# Patient Record
Sex: FEMALE | Race: WHITE | URBAN - METROPOLITAN AREA
[De-identification: names, ages, dates, MRNs, and addresses within clinical notes are randomized per-mention and may not be internally consistent; named-entity substitution may affect disease eponyms.]

---

## 2017-01-04 PROCEDURE — 88305 TISSUE EXAM BY PATHOLOGIST: CPT | Performed by: INTERNAL MEDICINE

## 2017-01-05 ENCOUNTER — HOSPITAL ENCOUNTER (OUTPATIENT)
Dept: LAB | Age: 62
Discharge: HOME | End: 2017-01-05

## 2017-04-13 ENCOUNTER — APPOINTMENT (RX ONLY)
Dept: URBAN - METROPOLITAN AREA CLINIC 349 | Facility: CLINIC | Age: 62
Setting detail: DERMATOLOGY
End: 2017-04-13

## 2017-04-13 DIAGNOSIS — L23.89 ALLERGIC CONTACT DERMATITIS DUE TO OTHER AGENTS: ICD-10-CM

## 2017-04-13 PROCEDURE — ? RECOMMENDATIONS

## 2017-04-13 PROCEDURE — 99201: CPT

## 2017-04-13 PROCEDURE — ? PRESCRIPTION

## 2017-04-13 PROCEDURE — ? COUNSELING

## 2017-04-13 RX ORDER — MUPIROCIN 20 MG/G
OINTMENT TOPICAL
Qty: 1 | Refills: 2 | Status: ERX | COMMUNITY
Start: 2017-04-13

## 2017-04-13 RX ORDER — CETIRIZINE HCL/PSEUDOEPHEDRINE 5 MG-120MG
TABLET, EXTENDED RELEASE 12 HR ORAL
Qty: 60 | Refills: 1 | Status: ERX | COMMUNITY
Start: 2017-04-13

## 2017-04-13 RX ORDER — PREDNISONE 10 MG/1
TABLET ORAL
Qty: 45 | Refills: 0 | Status: ERX | COMMUNITY
Start: 2017-04-13

## 2017-04-13 RX ADMIN — MUPIROCIN: 20 OINTMENT TOPICAL at 00:00

## 2017-04-13 RX ADMIN — PREDNISONE: 10 TABLET ORAL at 00:00

## 2017-04-13 RX ADMIN — Medication: at 00:00

## 2017-04-13 ASSESSMENT — LOCATION ZONE DERM: LOCATION ZONE: FACE

## 2017-04-13 ASSESSMENT — LOCATION DETAILED DESCRIPTION DERM
LOCATION DETAILED: LEFT CENTRAL MALAR CHEEK
LOCATION DETAILED: RIGHT INFERIOR CENTRAL MALAR CHEEK

## 2017-04-13 ASSESSMENT — LOCATION SIMPLE DESCRIPTION DERM
LOCATION SIMPLE: LEFT CHEEK
LOCATION SIMPLE: RIGHT CHEEK

## 2017-04-13 NOTE — PROCEDURE: RECOMMENDATIONS
Recommendations (Free Text): Prednisone 10 mg  2 tonight then 4 each a.m. X 4 days, 3x4, 2x4, 1x7\\nZyrtec 10mg every 12 hours\\nMupirocin ointment three times daily to cracks and fissures\\nSamples of pandel cream to use three times daily x 4 days then twice daily
Detail Level: Zone

## 2017-04-13 NOTE — HPI: RASH
How Severe Is Your Rash?: moderate
Is This A New Presentation, Or A Follow-Up?: Rash
Additional History: Waxes and wains.  Better but much worse this past Monday.

## 2017-07-12 ENCOUNTER — RX ONLY (OUTPATIENT)
Age: 62
Setting detail: RX ONLY
End: 2017-07-12

## 2017-07-12 RX ORDER — MOMETASONE FUROATE 1 MG/G
CREAM TOPICAL
Qty: 1 | Refills: 0 | Status: ERX | COMMUNITY
Start: 2017-07-12

## 2022-07-13 ENCOUNTER — INITIAL CONSULT (OUTPATIENT)
Dept: CARDIOLOGY CLINIC | Age: 67
End: 2022-07-13
Payer: COMMERCIAL

## 2022-07-13 VITALS
WEIGHT: 245.6 LBS | HEART RATE: 69 BPM | HEIGHT: 65 IN | DIASTOLIC BLOOD PRESSURE: 94 MMHG | BODY MASS INDEX: 40.92 KG/M2 | SYSTOLIC BLOOD PRESSURE: 172 MMHG

## 2022-07-13 DIAGNOSIS — E78.5 DYSLIPIDEMIA: ICD-10-CM

## 2022-07-13 DIAGNOSIS — E66.01 MORBID OBESITY (HCC): ICD-10-CM

## 2022-07-13 DIAGNOSIS — I49.3 PVC'S (PREMATURE VENTRICULAR CONTRACTIONS): ICD-10-CM

## 2022-07-13 DIAGNOSIS — R06.02 SHORTNESS OF BREATH: Primary | ICD-10-CM

## 2022-07-13 DIAGNOSIS — R73.03 PRE-DIABETES: ICD-10-CM

## 2022-07-13 PROCEDURE — 1123F ACP DISCUSS/DSCN MKR DOCD: CPT | Performed by: INTERNAL MEDICINE

## 2022-07-13 PROCEDURE — 93000 ELECTROCARDIOGRAM COMPLETE: CPT | Performed by: INTERNAL MEDICINE

## 2022-07-13 PROCEDURE — 99204 OFFICE O/P NEW MOD 45 MIN: CPT | Performed by: INTERNAL MEDICINE

## 2022-07-13 RX ORDER — CETIRIZINE HYDROCHLORIDE 10 MG/1
TABLET ORAL
COMMUNITY

## 2022-07-13 RX ORDER — CARVEDILOL 6.25 MG/1
TABLET ORAL AS NEEDED
COMMUNITY
Start: 2021-11-10

## 2022-07-13 RX ORDER — HYDROCHLOROTHIAZIDE 25 MG/1
25 TABLET ORAL DAILY
COMMUNITY
Start: 2021-10-28

## 2022-07-13 ASSESSMENT — ENCOUNTER SYMPTOMS: SHORTNESS OF BREATH: 1

## 2022-07-13 NOTE — PATIENT INSTRUCTIONS
Patient Education        Shortness of Breath: Care Instructions  Your Care Instructions     Shortness of breath has many causes. Sometimes conditions such as anxiety can lead to shortness of breath. Some people get mild shortness of breath when they exercise. Trouble breathing also can be a symptom of a serious problem, such asasthma, lung disease, emphysema, heart problems, and pneumonia. If your shortness of breath continues, you may need tests and treatment. Watchfor any changes in your breathing and other symptoms. Follow-up care is a key part of your treatment and safety. Be sure to make and go to all appointments, and call your doctor if you are having problems. It's also a good idea to know your test results and keep alist of the medicines you take. How can you care for yourself at home?  Do not smoke or allow others to smoke around you. If you need help quitting, talk to your doctor about stop-smoking programs and medicines. These can increase your chances of quitting for good.  Get plenty of rest and sleep.  Take your medicines exactly as prescribed. Call your doctor if you think you are having a problem with your medicine.  Find healthy ways to deal with stress. ? Exercise daily. ? Get plenty of sleep. ? Eat regularly and well. When should you call for help? Call 911 anytime you think you may need emergency care. For example, call if:     You have severe shortness of breath.      You have symptoms of a heart attack. These may include:  ? Chest pain or pressure, or a strange feeling in the chest.  ? Sweating. ? Shortness of breath. ? Nausea or vomiting. ? Pain, pressure, or a strange feeling in the back, neck, jaw, or upper belly or in one or both shoulders or arms. ? Lightheadedness or sudden weakness. ? A fast or irregular heartbeat. After you call 911, the  may tell you to chew 1 adult-strength or 2 to 4 low-dose aspirin. Wait for an ambulance.  Do not try to drive yourself. Call your doctor now or seek immediate medical care if:     Your shortness of breath gets worse or you start to wheeze. Wheezing is a high-pitched sound when you breathe.      You wake up at night out of breath or have to prop your head up on several pillows to breathe.      You are short of breath after only light activity or while at rest.   Watch closely for changes in your health, and be sure to contact your doctor if:     You do not get better over the next 1 to 2 days. Where can you learn more? Go to https://JAZD MarketspeSwatchcloudeweb.Preisbock. org and sign in to your [employer] account. Enter S780 in the [employer] box to learn more about \"Shortness of Breath: Care Instructions. \"     If you do not have an account, please click on the \"Sign Up Now\" link. Current as of: March 9, 2022               Content Version: 13.3  © 2047-9196 Healthwise, [employer]. Care instructions adapted under license by [facility] ([facility]). If you have questions about a medical condition or this instruction, always ask your healthcare professional. [place] [address] any warranty or liability for your use of this information.          Make a \"SMART\" plan:  Specific  Measurable  Attainable  Realistic  Time Limited

## 2022-07-13 NOTE — PROGRESS NOTES
[facility] [place] CARDIOLOGY  [address], [address]  PHONE: [phone]      22    NAME:  Carolina Messina  : 1955  MRN: [id number]         SUBJECTIVE:   Carolina Messina is a 78 y.o. female seen for a consultation visit regarding the following:     Chief Complaint   Patient presents with    Consultation    Irregular Heart Beat            HPI:  Consultation is requested by Sumanth Gilmore MD for evaluation of Consultation and Irregular Heart Beat   . Consult dyspnea up inclines. She describes herself as always active though always struggled with weight. She's gained about 40-45 lbs since 2019. Her mother Alejandro Hodge, my patient) broke her hip, patient admits she used food as a stress relief. She's interested in starting an exercise program to get her life back on track but wants to make sure its safe. She's attributed her dyspnea to the weight gain. She had a life line screen that was normal apart from a mildly elevated CRP but she has arthritis and obesity as well. She is a nurse by training but distressed by having white coat syndrome. She monitors it at home and sees it consistently 120-140/70-95, generally well controlled. She has a history of benign isolated PVC's but those have improved significantly in recent years. Past Medical History, Past Surgical History, Family history, Social History, and Medications were all reviewed with the patient today and updated as necessary. Prior to Admission medications    Medication Sig Start Date End Date Taking?  Authorizing Provider   carvedilol (COREG) 6.25 MG tablet TAKE 1 TABLET BY MOUTH TWO TIMES A DAY 11/10/21  Yes Historical Provider, MD   hydroCHLOROthiazide (HYDRODIURIL) 25 MG tablet Take 25 mg by mouth daily 10/28/21  Yes Historical Provider, MD   metFORMIN (GLUCOPHAGE) 500 MG tablet TAKE 1 TABLET BY MOUTH TWICE A DAY WITH A MEAL FOR 90 DAYS 22  Yes Historical Provider, MD cetirizine (ZYRTEC) 10 MG tablet Take by mouth   Yes Historical Provider, MD   Probiotic Product (PROBIOTIC-10 PO) Take 1 tablet by mouth   Yes Historical Provider, MD   CALCIUM CITRATE PO Take by mouth   Yes Historical Provider, MD   MAGNESIUM CITRATE PO Take by mouth   Yes Historical Provider, MD   Coenzyme Q10 (COQ10 PO) Take by mouth   Yes Historical Provider, MD   Cholecalciferol (VITAMIN D3) 125 MCG (5000 UT) TABS Take by mouth   Yes Historical Provider, MD   Multiple Vitamins-Minerals (LUTEIN-ZEAXANTHIN PO) Take by mouth   Yes Historical Provider, MD     Allergies   Allergen Reactions    Methylprednisolone Sodium Succ Other (See Comments)     hypertension  Other reaction(s): Other-A  hypertension  hypertension  hypertension      Amoxicillin-Pot Clavulanate Other (See Comments)     Stomach discomfort/diarrhea  Stomach discomfort/diarrhea  Stomach discomfort/diarrhea      Cephalexin Diarrhea     Past Medical History:   Diagnosis Date    Obesity     Pre-diabetes     PVC's (premature ventricular contractions)     White coat syndrome without diagnosis of hypertension      Past Surgical History:   Procedure Laterality Date     SECTION      x2    MANDIBLE SURGERY       Family History   Problem Relation Age of Onset    Arrhythmia Mother     Pacemaker Mother     Pulmonary Fibrosis Father      Social History     Tobacco Use    Smoking status: Never Smoker    Smokeless tobacco: Never Used   Substance Use Topics    Alcohol use: Yes     Comment: rarely       ROS:    Review of Systems   Constitutional: Positive for malaise/fatigue and weight gain. Cardiovascular: Negative for chest pain. Respiratory: Positive for shortness of breath. Musculoskeletal: Positive for arthritis. PHYSICAL EXAM:   BP (!) 172/94   Pulse 69   Ht 5' 4.5\" (1.638 m)   Wt 245 lb 9.6 oz (111.4 kg)   BMI 41.51 kg/m²      Physical Exam  Constitutional:       Appearance: Normal appearance.    HENT:      Head: Normocephalic and atraumatic. Eyes:      Conjunctiva/sclera: Conjunctivae normal.   Neck:      Vascular: No carotid bruit. Cardiovascular:      Rate and Rhythm: Normal rate and regular rhythm. Heart sounds: No murmur heard. No friction rub. No gallop. Pulmonary:      Effort: No respiratory distress. Breath sounds: No wheezing or rales. Abdominal:      General: Abdomen is flat. There is no distension. Palpations: Abdomen is soft. Tenderness: There is no abdominal tenderness. Musculoskeletal:         General: No swelling. Cervical back: Neck supple. Skin:     General: Skin is warm and dry. Neurological:      General: No focal deficit present. Mental Status: She is alert. Psychiatric:         Mood and Affect: Mood normal.         Behavior: Behavior normal.         Medical problems and test results were reviewed with the patient today. DATA REVIEW    Recent labs reviewed:  Normal CMP, CBC, TSH  A1C 5.9%    HDL 48     EKG    Sinus  Rhythm 69  normal axis  RBBB  normal ST/T waves    CXR/IMAGING        DEVICE INTERROGATION        OUTSIDE RECORDS REVIEW    Records from outside providers have been reviewed and summarized as noted in the HPI, past history and data review sections of this note, and reviewed with patient. .       ASSESSMENT and PLAN    Urvashi Bucio was seen today for consultation and irregular heart beat. Diagnoses and all orders for this visit:    Shortness of breath  -     EKG 12 Lead  -     Transthoracic echocardiogram (TTE) complete with contrast, bubble, strain, and 3D PRN; Future  -     Nuclear stress test with myocardial perfusion; Future    PVC's (premature ventricular contractions)    Pre-diabetes    Dyslipidemia    Morbid obesity (HCC)          IMPRESSION:    Multifactorial dyspnea.   Moderate to high pre test probability of disease is class IIa indication for imaging protocol      Echo evaluate LV function    Low risk to begin walking    Return for AFTER PROCEDURE TO REVIEW RESULTS. Thank you for allowing me to participate in this patient's care. Please call or contact me if there are any questions or concerns regarding the above.       Tarqi Haddad MD  07/13/22  11:40 AM

## 2022-07-22 PROBLEM — R06.02 SHORTNESS OF BREATH: Status: ACTIVE | Noted: 2022-07-22

## 2022-08-23 ENCOUNTER — OFFICE VISIT (OUTPATIENT)
Dept: CARDIOLOGY CLINIC | Age: 67
End: 2022-08-23
Payer: COMMERCIAL

## 2022-08-23 VITALS
DIASTOLIC BLOOD PRESSURE: 84 MMHG | SYSTOLIC BLOOD PRESSURE: 150 MMHG | HEART RATE: 84 BPM | HEIGHT: 64 IN | WEIGHT: 245 LBS | BODY MASS INDEX: 41.83 KG/M2

## 2022-08-23 DIAGNOSIS — R03.0 ELEVATED BLOOD PRESSURE READING WITHOUT DIAGNOSIS OF HYPERTENSION: Primary | ICD-10-CM

## 2022-08-23 DIAGNOSIS — R06.02 SHORTNESS OF BREATH: ICD-10-CM

## 2022-08-23 PROCEDURE — 1123F ACP DISCUSS/DSCN MKR DOCD: CPT | Performed by: INTERNAL MEDICINE

## 2022-08-23 PROCEDURE — 99213 OFFICE O/P EST LOW 20 MIN: CPT | Performed by: INTERNAL MEDICINE

## 2022-08-23 NOTE — PATIENT INSTRUCTIONS
drinks like soda. The Mediterranean diet may also include red wine with your meal--1 glass eachday for women and up to 2 glasses a day for men. Tips for eating at home  Use herbs, spices, garlic, lemon zest, and citrus juice instead of salt to add flavor to foods. Add avocado slices to your sandwich instead of scott. Have fish for lunch or dinner instead of red meat. Brush the fish with olive oil, and broil or grill it. Sprinkle your salad with seeds or nuts instead of cheese. Cook with olive or canola oil instead of butter or oils that are high in saturated fat. Switch from 2% milk or whole milk to 1% or fat-free milk. Dip raw vegetables in a vinaigrette dressing or hummus instead of dips made from mayonnaise or sour cream.  Have a piece of fruit for dessert instead of a piece of cake. Try baked apples, or have some dried fruit. Tips for eating out  Try broiled, grilled, baked, or poached fish instead of having it fried or breaded. Ask your  to have your meals prepared with olive oil instead of butter. Order dishes made with marinara sauce or sauces made from olive oil. Avoid sauces made from cream or mayonnaise. Choose whole-grain breads, whole wheat pasta and pizza crust, brown rice, beans, and lentils. Cut back on butter or margarine on bread. Instead, you can dip your bread in a small amount of olive oil. Ask for a side salad or grilled vegetables instead of french fries or chips. Where can you learn more? Go to https://Movi Medicalwilmaeb.Microarrays. org and sign in to your [employer] account. Enter [phone] in the [facility] box to learn more about \"Learning About the Mediterranean Diet. \"     If you do not have an account, please click on the \"Sign Up Now\" link. Current as of: September 8, 2021               Content Version: 13.3  © 3330-8425 Healthwise, Incorporated. Care instructions adapted under license by [facility] ([facility]).  If you have questions about a medical condition or this instruction, always ask your healthcare professional. [place] [address] any warranty or liability for your use of this information.

## 2022-08-23 NOTE — PROGRESS NOTES
[facility] CARDIOLOGY  [address], [address]  PHONE: [phone]      22    NAME:  Armand Martínez  : 1955  MRN: [id number]         SUBJECTIVE:   Armand Martínez is a 78 y.o. female seen for a follow up visit regarding the following:     Chief Complaint   Patient presents with    Shortness of Breath    Palpitations    Follow-up     Cardiac testing            HPI:  Follow up  Shortness of Breath, Palpitations, and Follow-up (Cardiac testing)   . Follow up evaluation of dyspnea which looks well. She did demonstrate mild aerobic deconditioning which she expected having had 40 lb weight gain over 3 years. She has been nordic walking since last visit, she's lost an inch in several body measurements since then. She's limiting carbs which is her biggest temptation. Her BP was well controlled at each test and at home she stays 130-140. She uses carvedilol a few days a week for pvc control. PAST CARDIAC HISTORY:  Aug 2022        NST - breast attenuation, no ischemia, normal EF        Echo - mild LVH (1.3), normal SF and valves. Said to be abnormal DF but reviewed numbers, lat and septal e' ~ 6 only abnormalities noted. Key CAD CHF Meds            carvedilol (COREG) 6.25 MG tablet (Taking)    Class: Historical Med    hydroCHLOROthiazide (HYDRODIURIL) 25 MG tablet (Taking)    Class: Historical Med              Past Medical History, Past Surgical History, Family history, Social History, and Medications were all reviewed with the patient today and updated as necessary. Prior to Admission medications    Medication Sig Start Date End Date Taking?  Authorizing Provider   carvedilol (COREG) 6.25 MG tablet as needed 11/10/21  Yes Historical Provider, MD   hydroCHLOROthiazide (HYDRODIURIL) 25 MG tablet Take 25 mg by mouth daily 10/28/21  Yes Historical Provider, MD   cetirizine (ZYRTEC) 10 MG tablet Take by mouth   Yes Historical Provider, MD CALCIUM CITRATE PO Take by mouth   Yes Historical Provider, MD   MAGNESIUM CITRATE PO Take by mouth   Yes Historical Provider, MD   Coenzyme Q10 (COQ10 PO) Take by mouth   Yes Historical Provider, MD   Cholecalciferol (VITAMIN D3) 125 MCG (5000 UT) TABS Take by mouth   Yes Historical Provider, MD   metFORMIN (GLUCOPHAGE) 500 MG tablet TAKE 1 TABLET BY MOUTH TWICE A DAY WITH A MEAL FOR 90 DAYS 22   Historical Provider, MD   Probiotic Product (PROBIOTIC-10 PO) Take 1 tablet by mouth    Historical Provider, MD   Multiple Vitamins-Minerals (LUTEIN-ZEAXANTHIN PO) Take by mouth    Historical Provider, MD     Allergies   Allergen Reactions    Methylprednisolone Sodium Succ Other (See Comments)     Pt states can take \"regular prednisone\"  hypertension  Other reaction(s): Other-A  hypertension  hypertension  hypertension      Amoxicillin-Pot Clavulanate Other (See Comments)     Stomach discomfort/diarrhea  Stomach discomfort/diarrhea  Stomach discomfort/diarrhea      Cephalexin Diarrhea     Past Medical History:   Diagnosis Date    Obesity     Pre-diabetes     PVC's (premature ventricular contractions)     White coat syndrome without diagnosis of hypertension      Past Surgical History:   Procedure Laterality Date     SECTION      x2    MANDIBLE SURGERY       Family History   Problem Relation Age of Onset    Arrhythmia Mother     Pacemaker Mother     Pulmonary Fibrosis Father      Social History     Tobacco Use    Smoking status: Never    Smokeless tobacco: Never   Substance Use Topics    Alcohol use: Yes     Comment: rarely       ROS:    Review of Systems   Cardiovascular:  Negative for chest pain.         PHYSICAL EXAM:   BP (!) 150/84   Pulse 84   Ht 5' 4\" (1.626 m)   Wt 245 lb (111.1 kg)   BMI 42.05 kg/m²      Wt Readings from Last 3 Encounters:   22 245 lb (111.1 kg)   22 245 lb (111.1 kg)   22 245 lb (111.1 kg)     BP Readings from Last 3 Encounters:   22 (!) 150/84   22 132/82   07/20/22 (!) 138/90     Pulse Readings from Last 3 Encounters:   08/23/22 84   07/20/22 74   07/13/22 69           Physical Exam  Vitals reviewed. Medical problems and test results were reviewed with the patient today. DATA REVIEW           EKG        CXR/IMAGING        DEVICE INTERROGATION        OUTSIDE RECORDS REVIEW    Records from outside providers have been reviewed and summarized as noted in the HPI, past history and data review sections of this note, and reviewed with patient. .       ASSESSMENT and PLAN    Alysa Centeno was seen today for shortness of breath, palpitations and follow-up. Diagnoses and all orders for this visit:    Elevated blood pressure reading without diagnosis of hypertension    Shortness of breath        IMPRESSION:    Feeling better now walking for exercise, already losing inches. BP at home is controlled and will continue to improve with ongoing exercise and eventual weight loss. Continue current plan, pvc's controlled with minimal prn therapy        Return if symptoms worsen or fail to improve. Thank you for allowing me to participate in this patient's care. Please call or contact me if there are any questions or concerns regarding the above.       Sharad Brunner MD  08/23/22  12:21 PM

## 2024-08-07 ENCOUNTER — HOSPITAL ENCOUNTER (OUTPATIENT)
Dept: ULTRASOUND IMAGING | Age: 69
Discharge: HOME | End: 2024-08-10
Payer: MEDICARE

## 2024-08-07 DIAGNOSIS — R22.41 LOCALIZED SWELLING, MASS AND LUMP, LOWER LIMB, RIGHT: ICD-10-CM

## 2024-08-07 PROCEDURE — 76882 US LMTD JT/FCL EVL NVASC XTR: CPT

## 2025-05-21 NOTE — PROGRESS NOTES
[facility] CARDIOLOGY  [address] [place] [address], [address]  [place] [address]  PHONE: [phone]      25    NAME:  Malren Ren  : 1955  MRN: [id number]         SUBJECTIVE:   Marlen Ren is a 69 y.o. female seen for a follow up visit regarding the following:     Chief Complaint   Patient presents with    Follow-up    Palpitations            HPI:  Follow up  Follow-up and Palpitations   .    Last seen in  with negative evaluation of dyspnea  returns with complaints of palpitations/hx pvcs.  She went to [facility] 25 with palpitations, weakness, dizziness. She had a mild leukocytosis with left shift.  Chest CT normal heart size, evidence of small airway disease, mild dependent pulmonary venous congestion. Provider noted concern for PNA given leukocytosis and CXR findings but because she wasn't terribly symptomatic didn't treat it. Gave her a dose of carvedilol which she had taken in past years and sent her home    She had slowly building frequency of palpitations for some time.  She had stopped the carvedilol after she retired, worked with her then primary provider to taper it off because she felt better, though he did warn her she would likely have more palpitations.  She's actually been off it for about 5 years without difficulty until recently.  Since resuming the carvedilol she has felt better.  She also saw her PCP about the leukocytosis, she did go ahead and treat her with a Z-pack.  (Patient's father  with ILD so she's been extra attentive about this). Otherwise she's felt pretty well but has noticed some neuropathic symptoms in her feet slowly creeping up to the ankle, feels like a \"buzzing\", not much pain.  Her A1C is controlled.  She is taking a Mag supplement already for muscle cramps.  Exercise is in fits and starts, just completed a course of physical therapy with low back pain, has some \"minor\" compression fractures in her back.  She does not have osteoporosis but

## 2025-05-22 ENCOUNTER — OFFICE VISIT (OUTPATIENT)
Age: 70
End: 2025-05-22
Payer: MEDICARE

## 2025-05-22 VITALS
DIASTOLIC BLOOD PRESSURE: 80 MMHG | WEIGHT: 245 LBS | BODY MASS INDEX: 41.83 KG/M2 | HEIGHT: 64 IN | HEART RATE: 72 BPM | SYSTOLIC BLOOD PRESSURE: 144 MMHG

## 2025-05-22 DIAGNOSIS — R00.2 PALPITATIONS: Primary | ICD-10-CM

## 2025-05-22 DIAGNOSIS — R07.89 CHEST DISCOMFORT: ICD-10-CM

## 2025-05-22 PROCEDURE — 99214 OFFICE O/P EST MOD 30 MIN: CPT | Performed by: INTERNAL MEDICINE

## 2025-05-22 PROCEDURE — 93000 ELECTROCARDIOGRAM COMPLETE: CPT | Performed by: INTERNAL MEDICINE

## 2025-05-22 PROCEDURE — 1159F MED LIST DOCD IN RCRD: CPT | Performed by: INTERNAL MEDICINE

## 2025-05-22 PROCEDURE — 1123F ACP DISCUSS/DSCN MKR DOCD: CPT | Performed by: INTERNAL MEDICINE

## 2025-05-22 PROCEDURE — 1160F RVW MEDS BY RX/DR IN RCRD: CPT | Performed by: INTERNAL MEDICINE

## 2025-05-22 PROCEDURE — 1126F AMNT PAIN NOTED NONE PRSNT: CPT | Performed by: INTERNAL MEDICINE

## 2025-05-22 RX ORDER — VALSARTAN AND HYDROCHLOROTHIAZIDE 80; 12.5 MG/1; MG/1
1 TABLET, FILM COATED ORAL DAILY
COMMUNITY

## 2025-06-03 ENCOUNTER — RESULTS FOLLOW-UP (OUTPATIENT)
Dept: CARDIOLOGY | Age: 70
End: 2025-06-03

## 2025-06-18 ENCOUNTER — TELEPHONE (OUTPATIENT)
Dept: PULMONOLOGY | Age: 70
End: 2025-06-18

## 2025-06-23 ENCOUNTER — OFFICE VISIT (OUTPATIENT)
Dept: PULMONOLOGY | Age: 70
End: 2025-06-23
Payer: MEDICARE

## 2025-06-23 ENCOUNTER — HOSPITAL ENCOUNTER (OUTPATIENT)
Dept: GENERAL RADIOLOGY | Age: 70
Discharge: HOME | End: 2025-06-25
Payer: MEDICARE

## 2025-06-23 VITALS — HEIGHT: 64 IN | WEIGHT: 242 LBS | TEMPERATURE: 97.1 F | BODY MASS INDEX: 41.32 KG/M2

## 2025-06-23 DIAGNOSIS — R93.89 ABNORMAL CXR: ICD-10-CM

## 2025-06-23 DIAGNOSIS — R06.02 SHORTNESS OF BREATH: Primary | ICD-10-CM

## 2025-06-23 DIAGNOSIS — E66.813 CLASS 3 SEVERE OBESITY WITH BODY MASS INDEX (BMI) OF 40.0 TO 44.9 IN ADULT, UNSPECIFIED OBESITY TYPE, UNSPECIFIED WHETHER SERIOUS COMORBIDITY PRESENT (HCC): ICD-10-CM

## 2025-06-23 LAB
EXPIRATORY TIME: NORMAL
FEF 25-75% %PRED-PRE: NORMAL
FEF 25-75% PRED: NORMAL
FEF 25-75-PRE: NORMAL
FEV1 %PRED-PRE: 93 %
FEV1 PRED: 2.19 L
FEV1/FVC %PRED-PRE: NORMAL
FEV1/FVC PRED: 78 %
FEV1/FVC: 81 %
FEV1: 2.04 L
FVC %PRED-PRE: 88 %
FVC PRED: 2.83 L
FVC: 2.5 L
PEF %PRED-PRE: NORMAL
PEF PRED: NORMAL
PEF-PRE: NORMAL

## 2025-06-23 PROCEDURE — 99204 OFFICE O/P NEW MOD 45 MIN: CPT | Performed by: INTERNAL MEDICINE

## 2025-06-23 PROCEDURE — 94010 BREATHING CAPACITY TEST: CPT | Performed by: INTERNAL MEDICINE

## 2025-06-23 PROCEDURE — 1159F MED LIST DOCD IN RCRD: CPT | Performed by: INTERNAL MEDICINE

## 2025-06-23 PROCEDURE — 1123F ACP DISCUSS/DSCN MKR DOCD: CPT | Performed by: INTERNAL MEDICINE

## 2025-06-23 PROCEDURE — 71046 X-RAY EXAM CHEST 2 VIEWS: CPT

## 2025-06-23 ASSESSMENT — PULMONARY FUNCTION TESTS
FVC: 2.50
FVC_PREDICTED: 2.83
FEV1_PERCENT_PREDICTED_PRE: 93
FEV1: 2.04
FEV1/FVC_PREDICTED: 78
FEV1/FVC: 81
FEV1_PREDICTED: 2.19
FVC_PERCENT_PREDICTED_PRE: 88

## 2025-06-23 NOTE — PROGRESS NOTES
Name:  Marlen Ren  YOB: 1955   MRN: [id number]      Office Visit: 6/23/2025       Assessment & Plan (Medical Decision Making)    Impression: 69 y.o. female here for evaluation of abnormal cxr and sob    1. Shortness of breath  Pt with nl spirometry  - Spirometry Without Bronchodilator  Will get complete pfts  May be related to obesity and physcial deconditioning  2. Abnormal CXR  Repeat cxr today    3. Class 3 severe obesity with body mass index (BMI) of 40.0 to 44.9 in adult, unspecified obesity type, unspecified whether serious comorbidity present (HCC)  Weight loss would be beneficial    No orders of the defined types were placed in this encounter.    No orders of the defined types were placed in this encounter.    John Landis Jr, MD    No specialty comments available.  No problem-specific Assessment & Plan notes found for this encounter.              Total time for encounter on day of encounter was 64 minutes.  This time includes chart prep, review of tests/procedures, review of other provider's notes, documentation and counseling patient regarding disease process and medications.   _________________________________________________________________________    HISTORY OF PRESENT ILLNESS:    Ms. Marlen Ren is a 69 y.o. female who is seen at [facility] today for  New Patient     This is a 68 y/o female with a history of hypertension and previous history of idiopathic PVCs   Who developed some palpitations in may and went to there er.   Chest ct revealed mosaic attenuation . Lab was normal .  She later was seen by her family doctor and given 2 rounds of zithromax. Repeat cxr again showed infiltrates on 6/3/25  Currently the pt does have some sob with  exertion and notes that she gain go up 2 flights of stairs before having to stop.  There is some sob with vacuuming. No wheezing or chest pain. There is some minimal cough.   The pt has never smoked.  Her father had IPF and she

## 2025-06-25 ENCOUNTER — RESULTS FOLLOW-UP (OUTPATIENT)
Dept: PULMONOLOGY | Age: 70
End: 2025-06-25

## 2025-07-06 NOTE — PROGRESS NOTES
[facility] CARDIOLOGY  [address] [place] [address], [address]  [place] [address]  PHONE: [phone]      25    NAME:  Marlen Ren  : 1955  MRN: [id number]         SUBJECTIVE:   Marlen Ren is a 69 y.o. female seen for a follow up visit regarding the following:     Chief Complaint   Patient presents with    Palpitations     Echo and NST             HPI:  Follow up  Palpitations (Echo and NST )   .    Follow up palpitations and chest discomfort.  Echo and perfusion study negative.  She has felt better, minimal palpitations on carvedilol, seems to be working well at low dose.              PAST CARDIAC HISTORY:  Aug 2022        NST - breast attenuation, no ischemia, normal EF        Echo - mild LVH (1.3), normal SF and valves.  Said to be abnormal DF but reviewed numbers, lat and septal e' ~ 6 only abnormalities noted.    May 2025       EF 55-60%, mild LVH, normal DF, normal valves, mild LAE       NMST - normal perfusion and EF              Cardiac Medications       Antihypertensive Combinations       valsartan-hydroCHLOROthiazide (DIOVAN-HCT) 80-12.5 MG per tablet Take 1 tablet by mouth daily       Alpha-Beta Blockers       carvedilol (COREG) 6.25 MG tablet Take 1 tablet by mouth 2 times daily          Diabetic Medications       Biguanides       metFORMIN (GLUCOPHAGE) 500 MG tablet TAKE 1 TABLET BY MOUTH TWICE A DAY WITH A MEAL FOR 90 DAYS                Past Medical History, Past Surgical History, Family history, Social History, and Medications were all reviewed with the patient today and updated as necessary.     Prior to Admission medications    Medication Sig Start Date End Date Taking? Authorizing Provider   valsartan-hydroCHLOROthiazide (DIOVAN-HCT) 80-12.5 MG per tablet Take 1 tablet by mouth daily   Yes Provider, MD Jaden   Zinc Sulfate (ZINC 15 PO) Take by mouth   Yes Provider, MD Jaden   carvedilol (COREG) 6.25 MG tablet Take 1 tablet by mouth 2 times daily 11/10/21  Yes

## 2025-07-07 ENCOUNTER — OFFICE VISIT (OUTPATIENT)
Age: 70
End: 2025-07-07
Payer: MEDICARE

## 2025-07-07 VITALS
HEART RATE: 76 BPM | DIASTOLIC BLOOD PRESSURE: 62 MMHG | BODY MASS INDEX: 42.34 KG/M2 | HEIGHT: 64 IN | WEIGHT: 248 LBS | SYSTOLIC BLOOD PRESSURE: 138 MMHG

## 2025-07-07 DIAGNOSIS — R07.89 CHEST DISCOMFORT: ICD-10-CM

## 2025-07-07 DIAGNOSIS — R06.02 SHORTNESS OF BREATH: Primary | ICD-10-CM

## 2025-07-07 PROCEDURE — 1159F MED LIST DOCD IN RCRD: CPT | Performed by: INTERNAL MEDICINE

## 2025-07-07 PROCEDURE — 1126F AMNT PAIN NOTED NONE PRSNT: CPT | Performed by: INTERNAL MEDICINE

## 2025-07-07 PROCEDURE — 1160F RVW MEDS BY RX/DR IN RCRD: CPT | Performed by: INTERNAL MEDICINE

## 2025-07-07 PROCEDURE — 99214 OFFICE O/P EST MOD 30 MIN: CPT | Performed by: INTERNAL MEDICINE

## 2025-07-07 PROCEDURE — 1123F ACP DISCUSS/DSCN MKR DOCD: CPT | Performed by: INTERNAL MEDICINE
